# Patient Record
Sex: MALE | Race: WHITE | Employment: FULL TIME | URBAN - METROPOLITAN AREA
[De-identification: names, ages, dates, MRNs, and addresses within clinical notes are randomized per-mention and may not be internally consistent; named-entity substitution may affect disease eponyms.]

---

## 2017-12-26 ENCOUNTER — LAB ENCOUNTER (OUTPATIENT)
Dept: LAB | Facility: REFERENCE LAB | Age: 37
End: 2017-12-26
Attending: FAMILY MEDICINE
Payer: COMMERCIAL

## 2017-12-26 ENCOUNTER — OFFICE VISIT (OUTPATIENT)
Dept: FAMILY MEDICINE CLINIC | Facility: CLINIC | Age: 37
End: 2017-12-26

## 2017-12-26 VITALS
SYSTOLIC BLOOD PRESSURE: 100 MMHG | HEART RATE: 60 BPM | BODY MASS INDEX: 27.83 KG/M2 | HEIGHT: 73 IN | WEIGHT: 210 LBS | TEMPERATURE: 98 F | RESPIRATION RATE: 18 BRPM | DIASTOLIC BLOOD PRESSURE: 76 MMHG | OXYGEN SATURATION: 97 %

## 2017-12-26 DIAGNOSIS — Z11.3 SCREENING FOR STD (SEXUALLY TRANSMITTED DISEASE): ICD-10-CM

## 2017-12-26 DIAGNOSIS — Z00.00 LABORATORY EXAM ORDERED AS PART OF ROUTINE GENERAL MEDICAL EXAMINATION: ICD-10-CM

## 2017-12-26 DIAGNOSIS — E66.3 OVERWEIGHT (BMI 25.0-29.9): Primary | ICD-10-CM

## 2017-12-26 PROCEDURE — 86694 HERPES SIMPLEX NES ANTBDY: CPT | Performed by: FAMILY MEDICINE

## 2017-12-26 PROCEDURE — 99203 OFFICE O/P NEW LOW 30 MIN: CPT | Performed by: FAMILY MEDICINE

## 2017-12-26 PROCEDURE — 86696 HERPES SIMPLEX TYPE 2 TEST: CPT | Performed by: FAMILY MEDICINE

## 2017-12-26 PROCEDURE — 80050 GENERAL HEALTH PANEL: CPT | Performed by: FAMILY MEDICINE

## 2017-12-26 PROCEDURE — 86695 HERPES SIMPLEX TYPE 1 TEST: CPT | Performed by: FAMILY MEDICINE

## 2017-12-26 PROCEDURE — 36415 COLL VENOUS BLD VENIPUNCTURE: CPT | Performed by: FAMILY MEDICINE

## 2017-12-26 PROCEDURE — 87389 HIV-1 AG W/HIV-1&-2 AB AG IA: CPT | Performed by: FAMILY MEDICINE

## 2017-12-26 PROCEDURE — 82306 VITAMIN D 25 HYDROXY: CPT | Performed by: FAMILY MEDICINE

## 2017-12-26 PROCEDURE — 86780 TREPONEMA PALLIDUM: CPT | Performed by: FAMILY MEDICINE

## 2017-12-26 PROCEDURE — 80074 ACUTE HEPATITIS PANEL: CPT | Performed by: FAMILY MEDICINE

## 2017-12-26 PROCEDURE — 83036 HEMOGLOBIN GLYCOSYLATED A1C: CPT | Performed by: FAMILY MEDICINE

## 2017-12-26 PROCEDURE — 80061 LIPID PANEL: CPT | Performed by: FAMILY MEDICINE

## 2017-12-26 NOTE — PATIENT INSTRUCTIONS
The products and items listed below (the “Products”)  and their claims have not been evaluated by the Food and Drug Administration. Dietary products are not intended to treat, prevent, mitigate or cure disease.  Ultimately, you must draw your own conclusion your breath. You can literally say to yourself, “I am breathing in” as you breathe in and “I am breathing out” as you breathe out. Use these phrases to focus your mind. · Your mind may wander and that is ok!  When you recognize that it is wandering, witho allow this word to pull you into complete relaxation. Prayer - Blanchie Janelle Herr” or “Lord’s Prayer” or other prayer done in repetition using prayer beads    Use free meditations online:  https://www.[employer].[email or web address] com/DownloadMeditations. html  http://ww

## 2017-12-26 NOTE — PROGRESS NOTES
Rachele Patrick is a 40year old male.     Patient presents with:  Establish Care: estab care - patient has apt for px next week and wishes to get labs / std testing done and go review at px      HPI:   Ketogenic diet - 25 g of carbs per day  carbs com Wrist fracture surgery Right 1999       PHYSICAL EXAM:      12/26/17  1314   BP: 100/76   BP Location: Left arm   Pulse: 60   Resp: 18   Temp: 98.2 °F (36.8 °C)   TempSrc: Oral   SpO2: 97%   Weight: 210 lb   Height: 73\"       Physical Exam   Constitutiona prevent, mitigate or cure disease. Ultimately, you must draw your own conclusion as to the efficacy of the Product and immediately stop use of the Products if a negative reaction should arise.   You should always consult a licensed health care professional mind may wander and that is ok! When you recognize that it is wandering, without judgement allow it to be ok and then refocus back to your breath. · Use an application to help you.  A good free one is “Pranayama,” which provides sound prompts to help you f online:  https://www.[employer].org/. com/DownloadMeditations. html  [employer].pt. com  http://kahlil.[facility]/body. cfm?id=22  http://www. [employer]/ccl/guided-meditations  http://www. [employer]/cms/free-audio-meditations  [employer].tn

## 2017-12-29 ENCOUNTER — OFFICE VISIT (OUTPATIENT)
Dept: FAMILY MEDICINE CLINIC | Facility: CLINIC | Age: 37
End: 2017-12-29

## 2017-12-29 VITALS
BODY MASS INDEX: 27.04 KG/M2 | WEIGHT: 204 LBS | HEIGHT: 73 IN | TEMPERATURE: 98 F | SYSTOLIC BLOOD PRESSURE: 110 MMHG | DIASTOLIC BLOOD PRESSURE: 70 MMHG | OXYGEN SATURATION: 99 % | RESPIRATION RATE: 17 BRPM | HEART RATE: 66 BPM

## 2017-12-29 DIAGNOSIS — R79.89 LOW VITAMIN D LEVEL: ICD-10-CM

## 2017-12-29 DIAGNOSIS — Z00.00 ROUTINE MEDICAL EXAM: Primary | ICD-10-CM

## 2017-12-29 DIAGNOSIS — E66.3 OVERWEIGHT (BMI 25.0-29.9): ICD-10-CM

## 2017-12-29 PROCEDURE — 99395 PREV VISIT EST AGE 18-39: CPT | Performed by: FAMILY MEDICINE

## 2017-12-29 NOTE — PROGRESS NOTES
Miguel A Genao is a 40year old male who presents for a complete physical exam.     HPI:   Here for cpx. Feeling good. Restarted his ketogenic diet and his regular exercise after [place]. Has lost at least 5 lbs.   Weight when he is following Hilary Sidluis Multiple Vitamins-Minerals (MULTIVITAL) Oral Chew Tab Chew by mouth. Disp:  Rfl:    Omega-3 Fatty Acids (FISH OIL ADULT GUMMIES OR) Take by mouth.  Disp:  Rfl:    NON FORMULARY  Disp:  Rfl:        SOCIAL HISTORY:     Social History  Social History   Kaylen Mcgraw Neurological: He is alert and oriented to person, place, and time. No cranial nerve deficit. Gait normal.   Skin: Skin is warm and dry. No rash noted. No erythema.    Psychiatric: Mood, memory, affect and judgment normal.       ASSESSMENT AND PLAN:   Routin - online: [employer] Bio-d-mulsion (liquid)    Restore Sinus Spray  Restore supplement for mucosa health. This is a carbon, soil-based product that helps to rebuild the tight junctions, or important connections between cells.  These tight junctions ge

## 2018-01-09 ENCOUNTER — TELEPHONE (OUTPATIENT)
Dept: FAMILY MEDICINE CLINIC | Facility: CLINIC | Age: 38
End: 2018-01-09

## 2018-01-09 NOTE — TELEPHONE ENCOUNTER
Called patient to advise of lab results, let patient know that his cholesterol particle size is not at high risk. Patient verbalized understanding.